# Patient Record
Sex: MALE | Race: BLACK OR AFRICAN AMERICAN | Employment: FULL TIME | URBAN - METROPOLITAN AREA
[De-identification: names, ages, dates, MRNs, and addresses within clinical notes are randomized per-mention and may not be internally consistent; named-entity substitution may affect disease eponyms.]

---

## 2017-12-05 ENCOUNTER — OFFICE VISIT (OUTPATIENT)
Dept: INTERNAL MEDICINE CLINIC | Age: 27
End: 2017-12-05

## 2017-12-05 VITALS
HEART RATE: 88 BPM | SYSTOLIC BLOOD PRESSURE: 102 MMHG | WEIGHT: 203.6 LBS | BODY MASS INDEX: 30.16 KG/M2 | TEMPERATURE: 99 F | RESPIRATION RATE: 14 BRPM | OXYGEN SATURATION: 99 % | HEIGHT: 69 IN | DIASTOLIC BLOOD PRESSURE: 84 MMHG

## 2017-12-05 DIAGNOSIS — E66.9 OBESITY (BMI 30-39.9): ICD-10-CM

## 2017-12-05 DIAGNOSIS — Z00.00 PHYSICAL EXAM: Primary | ICD-10-CM

## 2017-12-05 LAB
A-G RATIO,AGRAT: 1.4 RATIO (ref 1.1–2.6)
ABSOLUTE LYMPHOCYTE COUNT, 10803: 1.4 K/UL (ref 1–4.8)
ALBUMIN SERPL-MCNC: 4.5 G/DL (ref 3.5–5)
ALP SERPL-CCNC: 134 U/L (ref 25–115)
ALT SERPL-CCNC: 45 U/L (ref 5–40)
ANION GAP SERPL CALC-SCNC: 13 MMOL/L
AST SERPL W P-5'-P-CCNC: 30 U/L (ref 10–37)
BASOPHILS # BLD: 0 K/UL (ref 0–0.2)
BASOPHILS NFR BLD: 0 % (ref 0–2)
BILIRUB SERPL-MCNC: 0.6 MG/DL (ref 0.2–1.2)
BUN SERPL-MCNC: 11 MG/DL (ref 6–22)
CALCIUM SERPL-MCNC: 9.8 MG/DL (ref 8.4–10.4)
CHLORIDE SERPL-SCNC: 99 MMOL/L (ref 98–110)
CHOLEST SERPL-MCNC: 203 MG/DL (ref 110–200)
CO2 SERPL-SCNC: 26 MMOL/L (ref 20–32)
CREAT SERPL-MCNC: 0.9 MG/DL (ref 0.5–1.2)
EOSINOPHIL # BLD: 0.3 K/UL (ref 0–0.5)
EOSINOPHIL NFR BLD: 5 % (ref 0–6)
ERYTHROCYTE [DISTWIDTH] IN BLOOD BY AUTOMATED COUNT: 13.2 % (ref 10–16)
GFRAA, 66117: >60
GFRNA, 66118: >60
GLOBULIN,GLOB: 3.2 G/DL (ref 2–4)
GLUCOSE SERPL-MCNC: 92 MG/DL (ref 70–99)
GRANULOCYTES,GRANS: 60 % (ref 40–75)
HCT VFR BLD AUTO: 43.2 % (ref 36.6–51.9)
HDLC SERPL-MCNC: 33 MG/DL (ref 40–59)
HGB BLD-MCNC: 14.6 G/DL (ref 13.2–17.3)
LDLC SERPL CALC-MCNC: 146 MG/DL (ref 50–99)
LYMPHOCYTES, LYMLT: 26 % (ref 27–45)
MCH RBC QN AUTO: 29 PG (ref 26–34)
MCHC RBC AUTO-ENTMCNC: 34 G/DL (ref 32–36)
MCV RBC AUTO: 85 FL (ref 80–95)
MONOCYTES # BLD: 0.4 K/UL (ref 0.1–0.9)
MONOCYTES NFR BLD: 8 % (ref 3–9)
NEUTROPHILS # BLD AUTO: 3.1 K/UL (ref 1.8–7.7)
PLATELET # BLD AUTO: 314 K/UL (ref 140–440)
PMV BLD AUTO: 11.7 FL (ref 6–10.8)
POTASSIUM SERPL-SCNC: 4.3 MMOL/L (ref 3.5–5.5)
PROT SERPL-MCNC: 7.7 G/DL (ref 6.4–8.3)
RBC # BLD AUTO: 5.07 M/UL (ref 3.8–5.8)
SODIUM SERPL-SCNC: 138 MMOL/L (ref 133–145)
TRIGL SERPL-MCNC: 122 MG/DL (ref 40–149)
VLDLC SERPL CALC-MCNC: 24 MG/DL (ref 8–30)
WBC # BLD AUTO: 5.2 K/UL (ref 4–11)

## 2017-12-05 NOTE — PROGRESS NOTES
28 y.o. BLACK OR  male who presents for evaluation. He seems to be doing well. The asthma has not flared since childhood really. No cardiovascular complaints. No set exercise although he averages twice a month going to the gym to do 1 mile on the treadmill about 15 min    No GI or  issues    Weight is up, admits he could do better w diet    Past Medical History:   Diagnosis Date    Asthma     childhood    Back pain 11/12    Eczema     Obesity (BMI 30-39.9) 12/5/2017     History reviewed. No pertinent surgical history. Social History     Social History    Marital status: SINGLE     Spouse name: N/A    Number of children: 0    Years of education: N/A     Occupational History    [employer] counselor YAZAN Godwin      Social History Main Topics    Smoking status: Current Some Day Smoker     Types: Cigars    Smokeless tobacco: Never Used      Comment: 1-2 cigars a year on average    Alcohol use Yes      Comment: social    Drug use: No    Sexual activity: Yes     Other Topics Concern    Not on file     Social History Narrative     Family History   Problem Relation Age of Onset    Hypertension Mother     Diabetes Maternal Aunt     Stroke Maternal Aunt     Hypertension Maternal Aunt     Stroke Paternal Aunt     Hypertension Paternal Aunt     Cancer Maternal Grandmother      colon     Immunization History   Administered Date(s) Administered    Influenza Vaccine 10/24/2016, 10/01/2017    TB Skin Test (PPD) Intradermal 08/06/2013, 09/09/2016     No Known Allergies     No current outpatient prescriptions on file. No current facility-administered medications for this visit.       REVIEW OF SYSTEMS:   Ophtho  no vision change or eye pain  Oral  no mouth pain, tongue or tooth problems  Ears  no hearing loss, ear pain, fullness, no swallowing problems  Cardiac  no CP, PND, orthopnea, edema, palpitations or syncope  Chest  no breast masses  Resp  no wheezing, chronic coughing, dyspnea  GI  no heartburn, nausea, vomiting, change in bowel habits, bleeding, hemorrhoids  Urinary  no dysuria, hematuria, flank pain, urgency, frequency  Genitals  no genital lesions, discharge, masses, ulceration, warts  Ortho  no swelling, dec ROM, myalgias  Derm  he does c/o rash in his lower abd for some time now, was previously tx'ed by Dr. Nick Guzman denies any anxiety or depression symptoms, no hallucinations or violent ideation  Constitutional  no wt loss, night sweats, unexplained fevers  Neuro  no focal weakness, numbness, paresthesias, incoordination, ataxia, involuntary movements  Endo - no polyuria, polydipsia, nocturia, hot flashes    Visit Vitals    /84 (BP 1 Location: Left arm, BP Patient Position: Sitting)    Pulse 88    Temp 99 °F (37.2 °C) (Oral)    Resp 14    Ht 5' 9\" (1.753 m)    Wt 203 lb 9.6 oz (92.4 kg)    SpO2 99%    BMI 30.07 kg/m2     Affect is appropriate. Mood stable  No apparent distress  HEENT --Anicteric sclerae, tympanic membranes normal,  ear canals normal.  PERRL, EOMI, conjunctiva and lids normal.  Disks were sharp  Sinuses were nontender, turbinates normal, hearing normal.  Oropharynx without  erythema, normal tongue, oral mucosa and tonsils. No thyromegaly, JVD, or bruits. Lungs --Clear to auscultation and percussion, normal percussion. Heart --Regular rate and rhythm, no murmurs, rubs, gallops, or clicks. Chest wall --Nontender to palpation. PMI normal.  Abdomen -- Soft and nontender, no hepatosplenomegaly or masses.   -- normal penis, no scrotal masses, testicular tenderness or hernias  Rectal showed some anal tags  Extremities -- Without cyanosis, clubbing, edema. 2+ pulses equally and bilaterally. LABS  From 11/12 showed gluc 88, cr 0.96, gfr 128, alt 33, chol 186, tg 89, hdl 41, ldl-c 127, wbc 6.2, hb 15.2, plt 308, ua neg     Patient Active Problem List   Diagnosis Code    Obesity (BMI 30-39. 9) E66.9     Assessment and plan:  1. H/O asthma, quiescent  2. Obesity. Lifestyle and dietary measures, portion control, inc exercise, nutrition consult at his work  3. General.  Labs drawn today      Above conditions discussed at length and patient vocalized understanding.   All questions answered to patient satisfaction      RTC 3-5 years assuming labs ok

## 2017-12-05 NOTE — MR AVS SNAPSHOT
Visit Information Date & Time Provider Department Dept. Phone Encounter #  
 12/5/2017  8:40 AM Yg Covington MD [facility]  Upcoming Health Maintenance Date Due DTaP/Tdap/Td series (1 - Tdap) 8/23/2010 Influenza Age 5 to Adult 8/1/2017 Allergies as of 12/5/2017  Review Complete On: 12/5/2017 By: Jd Maciel No Known Allergies Current Immunizations  Reviewed on 11/7/2012 Name Date Influenza Vaccine 10/24/2016 TB Skin Test (PPD) Intradermal 8/6/2013 Not reviewed this visit Vitals BP Pulse Temp Resp Height(growth percentile) Weight(growth percentile) 102/84 (BP 1 Location: Left arm, BP Patient Position: Sitting) 88 99 °F (37.2 °C) (Oral) 14 5' 9\" (1.753 m) 203 lb 9.6 oz (92.4 kg) SpO2 BMI Smoking Status 99% 30.07 kg/m2 Never Smoker Vitals History BMI and BSA Data Body Mass Index Body Surface Area 30.07 kg/m 2 2.12 m 2 Your Updated Medication List  
  
   
This list is accurate as of: 12/5/17  9:45 AM.  Always use your most recent med list.  
  
  
  
  
 PREDNISONE  
by Does Not Apply route. Introducing [facility]! Lidya Taveras introduces [employer] patient portal. Now you can access parts of your medical record, email your doctor's office, and request medication refills online. 1. In your internet browser, go to [email or web address] 2. Click on the First Time User? Click Here link in the Sign In box. You will see the New Member Sign Up page. 3. Enter your [employer] Access Code exactly as it appears below. You will not need to use this code after youve completed the sign-up process. If you do not sign up before the expiration date, you must request a new code. · [employer] Access Code: [email or web address] Expires: 3/5/2018  9:45 AM 
 
4.  Enter the last four digits of your Social Security Number (xxxx) and Date of Birth (mm/dd/yyyy) as indicated and click Submit. You will be taken to the next sign-up page. 5. Create a [employer] ID. This will be your [employer] login ID and cannot be changed, so think of one that is secure and easy to remember. 6. Create a [employer] password. You can change your password at any time. 7. Enter your Password Reset Question and Answer. This can be used at a later time if you forget your password. 8. Enter your e-mail address. You will receive e-mail notification when new information is available in [address] 9. Click Sign Up. You can now view and download portions of your medical record. 10. Click the Download Summary menu link to download a portable copy of your medical information. If you have questions, please visit the Frequently Asked Questions section of the [employer] website. Remember, [employer] is NOT to be used for urgent needs. For medical emergencies, dial 911. Now available from your iPhone and Android! Please provide this summary of care documentation to your next provider. Your primary care clinician is listed as Oswaldo Chamorro. If you have any questions after today's visit, please call [phone].

## 2017-12-05 NOTE — PROGRESS NOTES
1. Have you been to the ER, urgent care clinic or hospitalized since your last visit? NO.     2. Have you seen or consulted any other health care providers outside of the [address] [place] [address] since your last visit (Include any pap smears or colon screening)? NO      Do you have an Advanced Directive? NO    Would you like information on Advanced Directives?  NO

## 2017-12-08 ENCOUNTER — TELEPHONE (OUTPATIENT)
Dept: INTERNAL MEDICINE CLINIC | Age: 27
End: 2017-12-08

## 2017-12-08 DIAGNOSIS — R74.01 TRANSAMINASEMIA: Primary | ICD-10-CM

## 2017-12-08 DIAGNOSIS — Z11.3 SCREEN FOR STD (SEXUALLY TRANSMITTED DISEASE): ICD-10-CM

## 2017-12-08 NOTE — TELEPHONE ENCOUNTER
pls call    Results for orders placed or performed in visit on 12/05/17   CBC WITH AUTOMATED DIFF   Result Value Ref Range    WBC 5.2 4.0 - 11.0 K/uL    RBC 5.07 3.80 - 5.80 M/uL    HGB 14.6 13.2 - 17.3 g/dL    HCT 43.2 36.6 - 51.9 %    MCV 85 80 - 95 fL    MCH 29 26 - 34 pg    MCHC 34 32 - 36 g/dL    RDW 13.2 10.0 - 16.0 %    PLATELET 096 813 - 116 K/uL    MPV 11.7 (H) 6.0 - 10.8 fL    NEUTROPHILS 60 40 - 75 %    Lymphocytes 26 (L) 27 - 45 %    MONOCYTES 8 3 - 9 %    EOSINOPHILS 5 0 - 6 %    BASOPHILS 0 0 - 2 %    ABS. NEUTROPHILS 3.1 1.8 - 7.7 K/uL    ABSOLUTE LYMPHOCYTE COUNT 1.4 1.0 - 4.8 K/uL    ABS. MONOCYTES 0.4 0.1 - 0.9 K/uL    ABS. EOSINOPHILS 0.3 0.0 - 0.5 K/uL    ABS. BASOPHILS 0.0 0.0 - 0.2 K/uL   METABOLIC PANEL, COMPREHENSIVE   Result Value Ref Range    Glucose 92 70 - 99 mg/dL    BUN 11 6 - 22 mg/dL    Creatinine 0.9 0.5 - 1.2 mg/dL    Sodium 138 133 - 145 mmol/L    Potassium 4.3 3.5 - 5.5 mmol/L    Chloride 99 98 - 110 mmol/L    CO2 26 20 - 32 mmol/L    AST (SGOT) 30 10 - 37 U/L    ALT (SGPT) 45 (H) 5 - 40 U/L    Alk.  phosphatase 134 (H) 25 - 115 U/L    Bilirubin, total 0.6 0.2 - 1.2 mg/dL    Calcium 9.8 8.4 - 10.4 mg/dL    Protein, total 7.7 6.4 - 8.3 g/dL    Albumin 4.5 3.5 - 5.0 g/dL    A-G Ratio 1.4 1.1 - 2.6 ratio    Globulin 3.2 2.0 - 4.0 g/dL    Anion gap 13.0 mmol/L    GFRAA >60.0 >60.0    GFRNA >60.0 >60.0   LIPID PANEL   Result Value Ref Range    Triglyceride 122 40 - 149 mg/dL    HDL Cholesterol 33 (L) 40 - 59 mg/dL    Cholesterol, total 203 (H) 110 - 200 mg/dL    LDL, calculated 146 (H) 50 - 99 mg/dL    VLDL, calculated 24 8 - 30 mg/dL     Chol high - diet and exwercise, mail mediterranean diet sheet    lfts up - etiol unclear  Recheck in 1 month and proceed w w/u if persistent  Likely to be fatty liver though

## 2017-12-12 NOTE — TELEPHONE ENCOUNTER
Patient returning call says can call back at [phone] says he has orientation at 1pm if he could get a call back before then

## 2017-12-13 DIAGNOSIS — R74.01 TRANSAMINASEMIA: ICD-10-CM

## 2017-12-13 DIAGNOSIS — Z11.3 SCREEN FOR STD (SEXUALLY TRANSMITTED DISEASE): ICD-10-CM

## 2017-12-13 NOTE — TELEPHONE ENCOUNTER
Pt aware of message from RD  Med Diet sheet mailed  Pt is asking to be tested for HIV as well, can we do blood STD testing? He said he has a history of having unprotected sex often and just wants to be sure he is ok.   I would add them but I want to be sure most of them can be done by blood/urine

## 2017-12-13 NOTE — TELEPHONE ENCOUNTER
Shed blood test for hiv, hep b antigen/c antibody, rpr and urine early morning void for gc/chlam pls

## 2018-01-08 DIAGNOSIS — R74.01 TRANSAMINASEMIA: ICD-10-CM

## 2018-01-15 ENCOUNTER — TELEPHONE (OUTPATIENT)
Dept: INTERNAL MEDICINE CLINIC | Age: 28
End: 2018-01-15

## 2018-01-15 DIAGNOSIS — R74.01 TRANSAMINASEMIA: ICD-10-CM

## 2018-01-15 DIAGNOSIS — Z11.3 SCREEN FOR STD (SEXUALLY TRANSMITTED DISEASE): ICD-10-CM

## 2019-09-19 ENCOUNTER — TELEPHONE (OUTPATIENT)
Dept: INTERNAL MEDICINE CLINIC | Age: 29
End: 2019-09-19

## 2019-09-19 NOTE — TELEPHONE ENCOUNTER
Pt called and wanted to know about process of getting records, said he had an authorization to release records form. I explained the process but then he said they were asking for doctor signature. I said that doesn't sound like a request for records but maybe request for his patient info and he said yes, it's asking about allergies, meds, etc because he is \"going through a process\" and they need this info. Said he hasn't been here in a couple of years due to having no insurance. Wanted to know if RD would fill it out. Told him RD would have to see the form before he could answer that. He said he would drop form off Friday.

## 2020-12-18 PROBLEM — Z91.018 TREE NUT ALLERGY: Status: ACTIVE | Noted: 2020-12-18

## 2020-12-18 PROBLEM — T78.40XA ALLERGIC REACTION: Status: ACTIVE | Noted: 2020-12-18

## 2024-07-12 ENCOUNTER — DASHBOARD ENCOUNTERS (OUTPATIENT)
Age: 34
End: 2024-07-12

## 2024-07-12 ENCOUNTER — OFFICE VISIT (OUTPATIENT)
Dept: URBAN - METROPOLITAN AREA CLINIC 40 | Facility: CLINIC | Age: 34
End: 2024-07-12
Payer: COMMERCIAL

## 2024-07-12 VITALS
HEIGHT: 69 IN | BODY MASS INDEX: 33.03 KG/M2 | HEART RATE: 81 BPM | WEIGHT: 223 LBS | SYSTOLIC BLOOD PRESSURE: 126 MMHG | DIASTOLIC BLOOD PRESSURE: 70 MMHG | TEMPERATURE: 98.1 F

## 2024-07-12 DIAGNOSIS — B36.9 FUNGAL DERMATITIS: ICD-10-CM

## 2024-07-12 DIAGNOSIS — K50.113 CROHN'S DISEASE OF PERIANAL REGION WITH FISTULA: ICD-10-CM

## 2024-07-12 PROBLEM — 235796008: Status: ACTIVE | Noted: 2024-07-12

## 2024-07-12 PROCEDURE — 99205 OFFICE O/P NEW HI 60 MIN: CPT | Performed by: INTERNAL MEDICINE

## 2024-07-12 RX ORDER — FLUCONAZOLE 100 MG/1
1 TABLET TABLET ORAL DAILY
Qty: 14 TABLET | Refills: 1 | OUTPATIENT
Start: 2024-07-12 | End: 2024-08-09

## 2024-07-12 RX ORDER — ADALIMUMAB 80MG/0.8ML
STARTER KIT. 160MG DAY 1 AND 80MG DAY 14 KIT SUBCUTANEOUS EVERY 2 WEEKS
Qty: 3 PEN NEEDLE | Refills: 0 | OUTPATIENT
Start: 2024-07-12 | End: 2024-08-09

## 2024-07-12 RX ORDER — ADALIMUMAB 40MG/0.8ML
40 MG KIT SUBCUTANEOUS EVERY OTHER WEEK
Qty: 2 KIT | Refills: 5 | OUTPATIENT
Start: 2024-07-12 | End: 2024-12-26

## 2024-07-12 NOTE — EXAM-PHYSICAL EXAM
rectal: setons in place with mild drainage of serous material from fistulas, no abscess noted, perianal fungal infection

## 2024-07-12 NOTE — HPI-TODAY'S VISIT:
34 yo SABINO presents with chief complaint of significant perianal itching/discharge/discomfort. He has a history of perianal Crohn's disease, diagnosed in 2021 by his previous gastroenterologist in [place]. The patient reports having fistulas and abscesses that required surgical intervention and setons for constant draining, which have contributed to the itching. setons are still in place  He was on Humira until August 2023, but his insurance no longer covers it. He tried adalimumab, a Humira biosimilar, but experienced heart palpitations and discontinued the medication after two doses earlier this year. He has been without medication since June   The patient is open to trying new medications, such as Rinvok, an oral medication for Crohn's disease. He was doing well on Humira and was approaching remission.He wants to try to get back on Humira if possible as he had responded really well to it He is aware of the potential risks associated with Rinvok, such as blood clots in the legs or pulmonary embolism, shingles, MI, but feels that his young age and overall good health make it a viable option.  He occasionally smokes cigars and drinks alcohol. TB test-negative last year. last colonoscopy was in March. Showwed inflammation in the perianal region with fistulas

## 2024-07-15 ENCOUNTER — TELEPHONE ENCOUNTER (OUTPATIENT)
Dept: URBAN - METROPOLITAN AREA CLINIC 40 | Facility: CLINIC | Age: 34
End: 2024-07-15

## 2024-07-15 RX ORDER — UPADACITINIB 45 MG/1
1 TABLET TABLET, EXTENDED RELEASE ORAL ONCE A DAY
Qty: 90 TABLET | Refills: 0 | OUTPATIENT
Start: 2024-07-17 | End: 2024-10-15

## 2024-07-16 LAB
A/G RATIO: 1.3
ALBUMIN: 4.4
ALKALINE PHOSPHATASE: 158
ALT (SGPT): 28
AST (SGOT): 23
BILIRUBIN, TOTAL: 0.4
BUN/CREATININE RATIO: (no result)
BUN: 12
C-REACTIVE PROTEIN, QUANT: 43.7
CALCIUM: 9.5
CARBON DIOXIDE, TOTAL: 27
CHLORIDE: 102
CREATININE: 1.09
EGFR: 91
GLOBULIN, TOTAL: 3.3
GLUCOSE: 102
HBSAG SCREEN: (no result)
HEMATOCRIT: 39.5
HEMOGLOBIN: 13.6
HEP A AB, IGM: (no result)
HEP B CORE AB, IGM: (no result)
HEPATITIS C ANTIBODY: (no result)
MCH: 28.1
MCHC: 34.4
MCV: 81.6
MITOGEN-NIL: 7.83
MPV: 10.9
PLATELET COUNT: 338
POTASSIUM: 4.5
PROTEIN, TOTAL: 7.7
QUANTIFERON NIL VALUE: 0.04
QUANTIFERON TB1 AG VALUE: 0
QUANTIFERON TB2 AG VALUE: 0.01
QUANTIFERON-TB GOLD PLUS: NEGATIVE
RDW: 12.9
RED BLOOD CELL COUNT: 4.84
SODIUM: 138
WHITE BLOOD CELL COUNT: 5.8

## 2024-07-18 ENCOUNTER — TELEPHONE ENCOUNTER (OUTPATIENT)
Dept: URBAN - METROPOLITAN AREA CLINIC 40 | Facility: CLINIC | Age: 34
End: 2024-07-18

## 2024-07-22 ENCOUNTER — TELEPHONE ENCOUNTER (OUTPATIENT)
Dept: URBAN - METROPOLITAN AREA CLINIC 40 | Facility: CLINIC | Age: 34
End: 2024-07-22

## 2024-07-22 RX ORDER — UPADACITINIB 45 MG/1
1 TABLET TABLET, EXTENDED RELEASE ORAL ONCE A DAY
Qty: 90 TABLET | Refills: 0
Start: 2024-07-17 | End: 2024-10-20

## 2024-07-30 ENCOUNTER — TELEPHONE ENCOUNTER (OUTPATIENT)
Dept: URBAN - METROPOLITAN AREA CLINIC 74 | Facility: CLINIC | Age: 34
End: 2024-07-30

## 2024-07-31 ENCOUNTER — TELEPHONE ENCOUNTER (OUTPATIENT)
Dept: URBAN - METROPOLITAN AREA CLINIC 74 | Facility: CLINIC | Age: 34
End: 2024-07-31

## 2024-08-01 ENCOUNTER — TELEPHONE ENCOUNTER (OUTPATIENT)
Dept: URBAN - METROPOLITAN AREA CLINIC 74 | Facility: CLINIC | Age: 34
End: 2024-08-01

## 2024-08-02 ENCOUNTER — TELEPHONE ENCOUNTER (OUTPATIENT)
Dept: URBAN - METROPOLITAN AREA CLINIC 74 | Facility: CLINIC | Age: 34
End: 2024-08-02

## 2024-08-06 ENCOUNTER — TELEPHONE ENCOUNTER (OUTPATIENT)
Dept: URBAN - METROPOLITAN AREA CLINIC 74 | Facility: CLINIC | Age: 34
End: 2024-08-06

## 2024-08-09 ENCOUNTER — OFFICE VISIT (OUTPATIENT)
Dept: URBAN - METROPOLITAN AREA CLINIC 40 | Facility: CLINIC | Age: 34
End: 2024-08-09
Payer: COMMERCIAL

## 2024-08-09 VITALS
SYSTOLIC BLOOD PRESSURE: 138 MMHG | BODY MASS INDEX: 32.02 KG/M2 | DIASTOLIC BLOOD PRESSURE: 82 MMHG | HEART RATE: 108 BPM | TEMPERATURE: 98.5 F | WEIGHT: 216.2 LBS | HEIGHT: 69 IN

## 2024-08-09 DIAGNOSIS — K50.113 CROHN'S DISEASE OF PERIANAL REGION WITH FISTULA: ICD-10-CM

## 2024-08-09 DIAGNOSIS — B36.9 FUNGAL DERMATITIS: ICD-10-CM

## 2024-08-09 PROCEDURE — 99214 OFFICE O/P EST MOD 30 MIN: CPT | Performed by: INTERNAL MEDICINE

## 2024-08-09 RX ORDER — FLUCONAZOLE 100 MG/1
1 TABLET TABLET ORAL DAILY
Qty: 14 TABLET | Refills: 1 | Status: ON HOLD | COMMUNITY
Start: 2024-07-12 | End: 2024-08-09

## 2024-08-09 RX ORDER — ADALIMUMAB 40MG/0.8ML
40 MG KIT SUBCUTANEOUS EVERY OTHER WEEK
Qty: 2 KIT | Refills: 5 | OUTPATIENT

## 2024-08-09 RX ORDER — ADALIMUMAB 80MG/0.8ML
STARTER KIT. 160MG DAY 1 AND 80MG DAY 14 KIT SUBCUTANEOUS EVERY 2 WEEKS
Qty: 3 PEN NEEDLE | Refills: 0 | OUTPATIENT

## 2024-08-09 RX ORDER — UPADACITINIB 45 MG/1
1 TABLET TABLET, EXTENDED RELEASE ORAL ONCE A DAY
Qty: 90 TABLET | Refills: 0 | Status: ON HOLD | COMMUNITY
Start: 2024-07-17 | End: 2024-10-20

## 2024-08-09 RX ORDER — ADALIMUMAB 80MG/0.8ML
STARTER KIT. 160MG DAY 1 AND 80MG DAY 14 KIT SUBCUTANEOUS EVERY 2 WEEKS
Qty: 3 PEN NEEDLE | Refills: 0 | Status: ON HOLD | COMMUNITY
Start: 2024-07-12 | End: 2024-08-09

## 2024-08-09 RX ORDER — ADALIMUMAB 40MG/0.8ML
40 MG KIT SUBCUTANEOUS EVERY OTHER WEEK
Qty: 2 KIT | Refills: 5 | Status: ON HOLD | COMMUNITY
Start: 2024-07-12 | End: 2024-12-26

## 2024-08-09 NOTE — HPI-TODAY'S VISIT:
32 yo AAROBERTO presents with chief complaint of significant perianal itching/discharge/discomfort. He has a history of perianal Crohn's disease, diagnosed in 2021 by his previous gastroenterologist in [place]. The patient reports having fistulas and abscesses that required surgical intervention and setons for constant draining, which have contributed to the itching. setons are still in place  He was on Humira until August 2023, but his insurance no longer covers it. He tried adalimumab, a Humira biosimilar, but experienced heart palpitations and discontinued the medication after two doses earlier this year. He tried rinvoq samples. unfortunately, he developed pneumonia that required hospitalization soon after starting the medication. Does not want to go back on rinvoq. Feels better from pneumonia standpoint  He was doing well on Humira and was approaching remission. He wants to try to get back on Humira if possible as he had responded really well to it and tolerated it well. Needs shingrix/pneumococcal vaccine  He occasionally smokes cigars and drinks alcohol. TB test-negative last year. last colonoscopy was in March. Showed inflammation in the perianal region with fistulas

## 2024-08-12 ENCOUNTER — TELEPHONE ENCOUNTER (OUTPATIENT)
Dept: URBAN - METROPOLITAN AREA CLINIC 40 | Facility: CLINIC | Age: 34
End: 2024-08-12

## 2024-08-12 RX ORDER — ADALIMUMAB 80MG/0.8ML
STARTER KIT. 160MG DAY 1 AND 80MG DAY 14 KIT SUBCUTANEOUS EVERY 2 WEEKS
Qty: 3 PEN NEEDLE | Refills: 0
End: 2024-09-09

## 2024-08-12 RX ORDER — ADALIMUMAB 40MG/0.8ML
40 MG KIT SUBCUTANEOUS EVERY OTHER WEEK
Qty: 2 KIT | Refills: 5
End: 2025-01-27

## 2024-08-14 ENCOUNTER — TELEPHONE ENCOUNTER (OUTPATIENT)
Dept: URBAN - METROPOLITAN AREA CLINIC 40 | Facility: CLINIC | Age: 34
End: 2024-08-14

## 2024-08-15 ENCOUNTER — TELEPHONE ENCOUNTER (OUTPATIENT)
Dept: URBAN - METROPOLITAN AREA CLINIC 40 | Facility: CLINIC | Age: 34
End: 2024-08-15

## 2024-08-21 ENCOUNTER — TELEPHONE ENCOUNTER (OUTPATIENT)
Dept: URBAN - METROPOLITAN AREA CLINIC 40 | Facility: CLINIC | Age: 34
End: 2024-08-21

## 2024-08-21 RX ORDER — ADALIMUMAB 40MG/0.8ML
40 MG KIT SUBCUTANEOUS EVERY OTHER WEEK
Qty: 2 KIT | Refills: 5
End: 2025-02-06

## 2024-08-21 RX ORDER — ADALIMUMAB 80MG/0.8ML
STARTER KIT. 160MG DAY 1 AND 80MG DAY 14 KIT SUBCUTANEOUS EVERY 2 WEEKS
Qty: 3 PEN NEEDLE | Refills: 0
End: 2024-09-19

## 2024-09-09 ENCOUNTER — TELEPHONE ENCOUNTER (OUTPATIENT)
Dept: URBAN - METROPOLITAN AREA CLINIC 40 | Facility: CLINIC | Age: 34
End: 2024-09-09

## 2024-09-19 PROBLEM — 50440006: Status: ACTIVE | Noted: 2024-09-19

## 2024-09-20 ENCOUNTER — OFFICE VISIT (OUTPATIENT)
Dept: URBAN - METROPOLITAN AREA CLINIC 40 | Facility: CLINIC | Age: 34
End: 2024-09-20
Payer: COMMERCIAL

## 2024-09-20 VITALS
HEIGHT: 69 IN | BODY MASS INDEX: 33 KG/M2 | SYSTOLIC BLOOD PRESSURE: 130 MMHG | HEART RATE: 104 BPM | DIASTOLIC BLOOD PRESSURE: 82 MMHG | TEMPERATURE: 98.1 F | WEIGHT: 222.8 LBS | OXYGEN SATURATION: 97 %

## 2024-09-20 DIAGNOSIS — R74.8 ELEVATED ALKALINE PHOSPHATASE LEVEL: ICD-10-CM

## 2024-09-20 DIAGNOSIS — K50.113 CROHN'S DISEASE OF COLON WITH FISTULA: ICD-10-CM

## 2024-09-20 DIAGNOSIS — K60.3 ANAL FISTULA: ICD-10-CM

## 2024-09-20 PROCEDURE — 99213 OFFICE O/P EST LOW 20 MIN: CPT

## 2024-09-20 RX ORDER — UPADACITINIB 45 MG/1
1 TABLET TABLET, EXTENDED RELEASE ORAL ONCE A DAY
Qty: 90 TABLET | Refills: 0 | Status: ON HOLD | COMMUNITY
Start: 2024-07-17 | End: 2024-10-20

## 2024-09-20 RX ORDER — ADALIMUMAB 40MG/0.8ML
40 MG KIT SUBCUTANEOUS EVERY OTHER WEEK
Qty: 2 KIT | Refills: 5 | Status: ACTIVE | COMMUNITY
End: 2025-02-06

## 2024-09-20 RX ORDER — LOSARTAN POTASSIUM 25 MG/1
TABLET, FILM COATED ORAL
Qty: 60 TABLET | Status: ACTIVE | COMMUNITY

## 2024-09-20 NOTE — HPI-TODAY'S VISIT:
32 yo AAM presents with chief complaint of significant perianal itching/discharge/discomfort. He has a history of perianal Crohn's disease, diagnosed in 2021 by his previous gastroenterologist in [place]. The patient reports having fistulas and abscesses that required surgical intervention and setons for constant draining, which have contributed to the itching. setons are still in place  He was on Humira until August 2023, but his insurance no longer covers it. He tried adalimumab, a Humira biosimilar, but experienced heart palpitations and discontinued the medication after two doses earlier this year. He tried rinvoq samples. unfortunately, he developed pneumonia that required hospitalization soon after starting the medication. Does not want to go back on rinvoq. Feels better from pneumonia standpoint  Restarted on Humira at last visit. Patient states improvement since starting Humira. Second dose yesterday. No abdominal pain, rectal bleeding, diarrhea. Has follow up appt with colorectal surgeon.   Patient seen by colorectal surgery with recommendation of  the use of Calmoseptine frequently along with gauze pads in the gluteal cleft to help minimize friction and to absorb drainage in sweat. Will follow up in 1-2 months after initation of Humira for surgically planning.   Elevated ALP for many years. Previous work up included: negative Hep B panel, neg ASMA, AMA, ANCA.  No noted LESA on record review.   MRI w/ MRCP 08/2022: LIVER: No hepatic signal abnormality.  No focal hepatic lesions.  BILIARY: The gallbladder is unremarkable. No ductal dilatation or filling defect  on MRCP. PANCREAS:  No mass or ductal dilatation. A persistent pancreatic duct of  Santorini is noted.   Labs 07/2024: CRP 43.7 CMP with , other LFTs WNL  CBC normal Negative TB, neg acute hep panel   EGD/Colonoscopy 03/2024 at [facility]: Showed inflammation in the perianal region with fistulas. Small bowel bx: Mild ileocolitis.  See comment.No dysplasia or CMV.Comment: The colitis/ileocolitis is mild and nonspecific.  In a patient with a history of Crohn's disease, these changes could be consistent with active Crohn's disease.  Infection and antibiotic associated/C. difficile colitis are also possibilities.  Colon Bx: Focal colitis. See comment.No dysplasia or CMV. Stomach bx: Chronic gastritis. Immunohistochemical stain negative for Helicobacter organisms.

## 2024-10-21 ENCOUNTER — OFFICE VISIT (OUTPATIENT)
Dept: URBAN - METROPOLITAN AREA CLINIC 40 | Facility: CLINIC | Age: 34
End: 2024-10-21
Payer: COMMERCIAL

## 2024-10-21 ENCOUNTER — TELEPHONE ENCOUNTER (OUTPATIENT)
Dept: URBAN - METROPOLITAN AREA CLINIC 40 | Facility: CLINIC | Age: 34
End: 2024-10-21

## 2024-10-21 VITALS
DIASTOLIC BLOOD PRESSURE: 86 MMHG | BODY MASS INDEX: 33.65 KG/M2 | TEMPERATURE: 98.1 F | SYSTOLIC BLOOD PRESSURE: 130 MMHG | HEART RATE: 94 BPM | HEIGHT: 69 IN | WEIGHT: 227.2 LBS

## 2024-10-21 DIAGNOSIS — R74.8 ELEVATED ALKALINE PHOSPHATASE LEVEL: ICD-10-CM

## 2024-10-21 DIAGNOSIS — K50.113 CROHN'S DISEASE OF COLON WITH FISTULA: ICD-10-CM

## 2024-10-21 PROCEDURE — 99214 OFFICE O/P EST MOD 30 MIN: CPT | Performed by: INTERNAL MEDICINE

## 2024-10-21 RX ORDER — ADALIMUMAB 40MG/0.8ML
40 MG KIT SUBCUTANEOUS EVERY OTHER WEEK
Qty: 2 KIT | Refills: 5 | Status: ACTIVE | COMMUNITY
End: 2025-02-06

## 2024-10-21 RX ORDER — LOSARTAN POTASSIUM 25 MG/1
TABLET, FILM COATED ORAL
Qty: 60 TABLET | Status: ACTIVE | COMMUNITY

## 2024-10-21 NOTE — PHYSICAL EXAM GASTROINTESTINAL
Abdomen , soft, nontender, nondistended , no guarding or rigidity , no masses palpable , normal bowel sounds , Liver and Spleen,  no hepatosplenomegaly , liver nontender rectal: not performed

## 2024-10-21 NOTE — HPI-TODAY'S VISIT:
35 yo AAM presents with chief complaint of perianal itching/discharge/discomfort. He has a history of perianal Crohn's disease, diagnosed in 2021 by his previous gastroenterologist in [place]. The patient reports having fistulas and abscesses that required surgical intervention and setons for constant draining, which have contributed to the itching. setons are still in place  He was on Humira until August 2023, but his insurance no longer covers it. He tried adalimumab, a Humira biosimilar, but experienced heart palpitations and discontinued the medication after two doses earlier this year. He tried rinvoq samples. unfortunately, he developed pneumonia that required hospitalization soon after starting the medication. Does not want to go back on rinvoq. Feels better from pneumonia standpoint  Restarted on Humira in August. Patient states improvement since starting Humira. No abdominal pain, rectal bleeding, diarrhea. Has follow up appt with colorectal surgeon tomorrow.   Patient seen by colorectal surgery with recommendation of  the use of Calmoseptine frequently along with gauze pads in the gluteal cleft to help minimize friction and to absorb drainage in sweat.   Elevated ALP for many years. Previous work up included: negative Hep B panel, neg ASMA, AMA, ANCA.  No noted LESA on record review.   MRI w/ MRCP 08/2022: LIVER: No hepatic signal abnormality.  No focal hepatic lesions.  BILIARY: The gallbladder is unremarkable. No ductal dilatation or filling defect  on MRCP. PANCREAS:  No mass or ductal dilatation. A persistent pancreatic duct of  Santorini is noted.   Labs 07/2024: CRP 43.7 CMP with , other LFTs WNL  CBC normal Negative TB, neg acute hep panel   EGD/Colonoscopy 03/2024 at [facility]: Showed inflammation in the perianal region with fistulas. Small bowel bx: Mild ileocolitis.  See comment.No dysplasia or CMV.Comment: The colitis/ileocolitis is mild and nonspecific.  In a patient with a history of Crohn's disease, these changes could be consistent with active Crohn's disease.  Infection and antibiotic associated/C. difficile colitis are also possibilities.  Colon Bx: Focal colitis. See comment.No dysplasia or CMV. Stomach bx: Chronic gastritis. Immunohistochemical stain negative for Helicobacter organisms.

## 2024-12-16 ENCOUNTER — LAB OUTSIDE AN ENCOUNTER (OUTPATIENT)
Dept: URBAN - METROPOLITAN AREA CLINIC 40 | Facility: CLINIC | Age: 34
End: 2024-12-16

## 2024-12-17 ENCOUNTER — TELEPHONE ENCOUNTER (OUTPATIENT)
Dept: URBAN - METROPOLITAN AREA CLINIC 40 | Facility: CLINIC | Age: 34
End: 2024-12-17

## 2024-12-17 LAB — VITAMIN D,25-OH,TOTAL,IA: 20

## 2024-12-17 RX ORDER — ERGOCALCIFEROL 1.25 MG/1
TAKE ONE CAPSULE BY MOUTH ONCE WEEKLY CAPSULE, LIQUID FILLED ORAL
Qty: 4 | Refills: 2 | OUTPATIENT
Start: 2024-12-17 | End: 2025-03-11

## 2025-01-17 ENCOUNTER — LAB OUTSIDE AN ENCOUNTER (OUTPATIENT)
Dept: URBAN - METROPOLITAN AREA CLINIC 40 | Facility: CLINIC | Age: 35
End: 2025-01-17

## 2025-01-17 ENCOUNTER — OFFICE VISIT (OUTPATIENT)
Dept: URBAN - METROPOLITAN AREA CLINIC 40 | Facility: CLINIC | Age: 35
End: 2025-01-17
Payer: COMMERCIAL

## 2025-01-17 VITALS
BODY MASS INDEX: 34.18 KG/M2 | HEART RATE: 102 BPM | HEIGHT: 69 IN | SYSTOLIC BLOOD PRESSURE: 110 MMHG | WEIGHT: 230.8 LBS | DIASTOLIC BLOOD PRESSURE: 68 MMHG | TEMPERATURE: 97.4 F

## 2025-01-17 DIAGNOSIS — R74.8 ELEVATED ALKALINE PHOSPHATASE LEVEL: ICD-10-CM

## 2025-01-17 DIAGNOSIS — E55.9 VITAMIN D DEFICIENCY: ICD-10-CM

## 2025-01-17 DIAGNOSIS — K50.113 CROHN'S DISEASE OF COLON WITH FISTULA: ICD-10-CM

## 2025-01-17 DIAGNOSIS — K60.30 PERIANAL FISTULA: ICD-10-CM

## 2025-01-17 PROBLEM — 34713006: Status: ACTIVE | Noted: 2025-01-17

## 2025-01-17 PROCEDURE — 99214 OFFICE O/P EST MOD 30 MIN: CPT | Performed by: INTERNAL MEDICINE

## 2025-01-17 RX ORDER — ERGOCALCIFEROL 1.25 MG/1
TAKE ONE CAPSULE BY MOUTH ONCE WEEKLY CAPSULE, LIQUID FILLED ORAL
Qty: 4 | Refills: 2
Start: 2024-12-17

## 2025-01-17 RX ORDER — ERGOCALCIFEROL 1.25 MG/1
TAKE ONE CAPSULE BY MOUTH ONCE WEEKLY CAPSULE, LIQUID FILLED ORAL
Qty: 4 | Refills: 2 | Status: ACTIVE | COMMUNITY
Start: 2024-12-17 | End: 2025-03-11

## 2025-01-17 RX ORDER — LOSARTAN POTASSIUM 25 MG/1
TABLET, FILM COATED ORAL
Qty: 60 TABLET | Status: ACTIVE | COMMUNITY

## 2025-01-17 RX ORDER — ADALIMUMAB 40MG/0.8ML
40 MG KIT SUBCUTANEOUS EVERY OTHER WEEK
Qty: 2 KIT | Refills: 5 | Status: ACTIVE | COMMUNITY
End: 2025-02-06

## 2025-01-17 NOTE — HPI-TODAY'S VISIT:
35 yo AAM presents with chief complaint of perianal itching/discharge/discomfort. He has a history of perianal Crohn's disease, diagnosed in 2021 by his previous gastroenterologist in [place]. The patient reports having fistulas and abscesses that required surgical intervention and setons for constant draining, which have contributed to the itching. setons are still in place. recently replaced. Feels much better. rectal exam under anesthesia-no abscess/new fistulas  He was on Humira until August 2023, but his insurance no longer covers it. He tried adalimumab, a Humira biosimilar, but experienced heart palpitations and discontinued the medication after two doses earlier this year. He tried rinvoq samples. unfortunately, he developed pneumonia that required hospitalization soon after starting the medication. Does not want to go back on rinvoq.  Restarted on Humira in August 2024. Patient states improvement since starting Humira. No abdominal pain, rectal bleeding, diarrhea.   Elevated ALP for many years. Previous work up included: negative Hep B panel, neg ASMA, AMA, ANCA.  No noted LESA on record review.   MRI w/ MRCP 08/2022: LIVER: No hepatic signal abnormality.  No focal hepatic lesions.  BILIARY: The gallbladder is unremarkable. No ductal dilatation or filling defect  on MRCP. PANCREAS:  No mass or ductal dilatation. A persistent pancreatic duct of  Santorini is noted.   Labs 07/2024: CRP 43.7 CMP with , other LFTs WNL  CBC normal Negative TB, neg acute hep panel   EGD/Colonoscopy 03/2024 at [facility]: Showed inflammation in the perianal region with fistulas. Small bowel bx: Mild ileocolitis.  See comment.No dysplasia or CMV.Comment: The colitis/ileocolitis is mild and nonspecific.  In a patient with a history of Crohn's disease, these changes could be consistent with active Crohn's disease.  Infection and antibiotic associated/C. difficile colitis are also possibilities.  Colon Bx: Focal colitis. See comment.No dysplasia or CMV. Stomach bx: Chronic gastritis. Immunohistochemical stain negative for Helicobacter organisms.

## 2025-02-28 ENCOUNTER — OFFICE VISIT (OUTPATIENT)
Dept: URBAN - METROPOLITAN AREA SURGERY CENTER 30 | Facility: SURGERY CENTER | Age: 35
End: 2025-02-28

## 2025-04-11 ENCOUNTER — OFFICE VISIT (OUTPATIENT)
Dept: URBAN - METROPOLITAN AREA SURGERY CENTER 30 | Facility: SURGERY CENTER | Age: 35
End: 2025-04-11

## 2025-04-25 ENCOUNTER — OFFICE VISIT (OUTPATIENT)
Dept: URBAN - METROPOLITAN AREA CLINIC 40 | Facility: CLINIC | Age: 35
End: 2025-04-25

## 2025-04-28 ENCOUNTER — ERX REFILL RESPONSE (OUTPATIENT)
Dept: URBAN - METROPOLITAN AREA CLINIC 40 | Facility: CLINIC | Age: 35
End: 2025-04-28

## 2025-04-28 RX ORDER — ADALIMUMAB 40MG/0.4ML
INJECT 1 PEN (40MG) UNDER THE SKIN ONCE EVERY 2 WEEKS KIT SUBCUTANEOUS
Qty: 2 KIT | Refills: 6 | OUTPATIENT

## 2025-05-02 ENCOUNTER — OFFICE VISIT (OUTPATIENT)
Dept: URBAN - METROPOLITAN AREA SURGERY CENTER 30 | Facility: SURGERY CENTER | Age: 35
End: 2025-05-02

## 2025-06-12 ENCOUNTER — OFFICE VISIT (OUTPATIENT)
Dept: URBAN - METROPOLITAN AREA SURGERY CENTER 30 | Facility: SURGERY CENTER | Age: 35
End: 2025-06-12

## 2025-06-27 ENCOUNTER — OFFICE VISIT (OUTPATIENT)
Dept: URBAN - METROPOLITAN AREA CLINIC 40 | Facility: CLINIC | Age: 35
End: 2025-06-27